# Patient Record
Sex: FEMALE | Race: ASIAN | NOT HISPANIC OR LATINO | URBAN - METROPOLITAN AREA
[De-identification: names, ages, dates, MRNs, and addresses within clinical notes are randomized per-mention and may not be internally consistent; named-entity substitution may affect disease eponyms.]

---

## 2019-11-17 ENCOUNTER — EMERGENCY (EMERGENCY)
Facility: HOSPITAL | Age: 29
LOS: 1 days | Discharge: HOME | End: 2019-11-17
Admitting: EMERGENCY MEDICINE
Payer: MEDICAID

## 2019-11-17 VITALS
TEMPERATURE: 99 F | OXYGEN SATURATION: 100 % | HEART RATE: 89 BPM | SYSTOLIC BLOOD PRESSURE: 156 MMHG | DIASTOLIC BLOOD PRESSURE: 99 MMHG | RESPIRATION RATE: 16 BRPM

## 2019-11-17 LAB
ALBUMIN SERPL ELPH-MCNC: 4.5 G/DL — SIGNIFICANT CHANGE UP (ref 3.3–5)
ALP SERPL-CCNC: 138 U/L — HIGH (ref 40–120)
ALT FLD-CCNC: 45 U/L — HIGH (ref 4–33)
ANION GAP SERPL CALC-SCNC: 12 MMO/L — SIGNIFICANT CHANGE UP (ref 7–14)
AST SERPL-CCNC: 31 U/L — SIGNIFICANT CHANGE UP (ref 4–32)
BASOPHILS # BLD AUTO: 0.03 K/UL — SIGNIFICANT CHANGE UP (ref 0–0.2)
BASOPHILS NFR BLD AUTO: 0.4 % — SIGNIFICANT CHANGE UP (ref 0–2)
BILIRUB SERPL-MCNC: 0.6 MG/DL — SIGNIFICANT CHANGE UP (ref 0.2–1.2)
BUN SERPL-MCNC: 8 MG/DL — SIGNIFICANT CHANGE UP (ref 7–23)
CALCIUM SERPL-MCNC: 9.5 MG/DL — SIGNIFICANT CHANGE UP (ref 8.4–10.5)
CHLORIDE SERPL-SCNC: 104 MMOL/L — SIGNIFICANT CHANGE UP (ref 98–107)
CO2 SERPL-SCNC: 22 MMOL/L — SIGNIFICANT CHANGE UP (ref 22–31)
CREAT SERPL-MCNC: 0.61 MG/DL — SIGNIFICANT CHANGE UP (ref 0.5–1.3)
EOSINOPHIL # BLD AUTO: 0.08 K/UL — SIGNIFICANT CHANGE UP (ref 0–0.5)
EOSINOPHIL NFR BLD AUTO: 1 % — SIGNIFICANT CHANGE UP (ref 0–6)
GLUCOSE SERPL-MCNC: 93 MG/DL — SIGNIFICANT CHANGE UP (ref 70–99)
HCT VFR BLD CALC: 40.2 % — SIGNIFICANT CHANGE UP (ref 34.5–45)
HGB BLD-MCNC: 13.4 G/DL — SIGNIFICANT CHANGE UP (ref 11.5–15.5)
IMM GRANULOCYTES NFR BLD AUTO: 0.4 % — SIGNIFICANT CHANGE UP (ref 0–1.5)
LYMPHOCYTES # BLD AUTO: 2.38 K/UL — SIGNIFICANT CHANGE UP (ref 1–3.3)
LYMPHOCYTES # BLD AUTO: 29.3 % — SIGNIFICANT CHANGE UP (ref 13–44)
MCHC RBC-ENTMCNC: 30.5 PG — SIGNIFICANT CHANGE UP (ref 27–34)
MCHC RBC-ENTMCNC: 33.3 % — SIGNIFICANT CHANGE UP (ref 32–36)
MCV RBC AUTO: 91.6 FL — SIGNIFICANT CHANGE UP (ref 80–100)
MONOCYTES # BLD AUTO: 0.75 K/UL — SIGNIFICANT CHANGE UP (ref 0–0.9)
MONOCYTES NFR BLD AUTO: 9.2 % — SIGNIFICANT CHANGE UP (ref 2–14)
NEUTROPHILS # BLD AUTO: 4.85 K/UL — SIGNIFICANT CHANGE UP (ref 1.8–7.4)
NEUTROPHILS NFR BLD AUTO: 59.7 % — SIGNIFICANT CHANGE UP (ref 43–77)
NRBC # FLD: 0 K/UL — SIGNIFICANT CHANGE UP (ref 0–0)
PLATELET # BLD AUTO: 242 K/UL — SIGNIFICANT CHANGE UP (ref 150–400)
PMV BLD: 9.3 FL — SIGNIFICANT CHANGE UP (ref 7–13)
POTASSIUM SERPL-MCNC: 4.3 MMOL/L — SIGNIFICANT CHANGE UP (ref 3.5–5.3)
POTASSIUM SERPL-SCNC: 4.3 MMOL/L — SIGNIFICANT CHANGE UP (ref 3.5–5.3)
PROT SERPL-MCNC: 8.7 G/DL — HIGH (ref 6–8.3)
RBC # BLD: 4.39 M/UL — SIGNIFICANT CHANGE UP (ref 3.8–5.2)
RBC # FLD: 12 % — SIGNIFICANT CHANGE UP (ref 10.3–14.5)
SODIUM SERPL-SCNC: 138 MMOL/L — SIGNIFICANT CHANGE UP (ref 135–145)
WBC # BLD: 8.12 K/UL — SIGNIFICANT CHANGE UP (ref 3.8–10.5)
WBC # FLD AUTO: 8.12 K/UL — SIGNIFICANT CHANGE UP (ref 3.8–10.5)

## 2019-11-17 PROCEDURE — 99284 EMERGENCY DEPT VISIT MOD MDM: CPT | Mod: 25

## 2019-11-17 PROCEDURE — 93010 ELECTROCARDIOGRAM REPORT: CPT

## 2019-11-17 RX ORDER — ACETAMINOPHEN 500 MG
650 TABLET ORAL ONCE
Refills: 0 | Status: COMPLETED | OUTPATIENT
Start: 2019-11-17 | End: 2019-11-17

## 2019-11-17 RX ORDER — METOCLOPRAMIDE HCL 10 MG
10 TABLET ORAL ONCE
Refills: 0 | Status: COMPLETED | OUTPATIENT
Start: 2019-11-17 | End: 2019-11-17

## 2019-11-17 RX ORDER — MECLIZINE HCL 12.5 MG
25 TABLET ORAL ONCE
Refills: 0 | Status: COMPLETED | OUTPATIENT
Start: 2019-11-17 | End: 2019-11-17

## 2019-11-17 RX ORDER — SODIUM CHLORIDE 9 MG/ML
1000 INJECTION INTRAMUSCULAR; INTRAVENOUS; SUBCUTANEOUS ONCE
Refills: 0 | Status: COMPLETED | OUTPATIENT
Start: 2019-11-17 | End: 2019-11-17

## 2019-11-17 RX ADMIN — SODIUM CHLORIDE 1000 MILLILITER(S): 9 INJECTION INTRAMUSCULAR; INTRAVENOUS; SUBCUTANEOUS at 22:24

## 2019-11-17 RX ADMIN — Medication 650 MILLIGRAM(S): at 22:24

## 2019-11-17 RX ADMIN — Medication 25 MILLIGRAM(S): at 22:24

## 2019-11-17 NOTE — ED ADULT NURSE NOTE - OBJECTIVE STATEMENT
Pt arrives for new onset dizziness starting last night associate with N/V, saying when she opened her eyes the room was spinning. She said this episode was preceded by a headache. She states she has been getting these headaches on and off for the last 6 months; she has not seen a neurologist yet. #20 PIV to [facility], labs sent, meds given, Will continue to monitor

## 2019-11-17 NOTE — ED ADULT TRIAGE NOTE - CHIEF COMPLAINT QUOTE
dizziness    states yesterday, right side of head started to hurt, then vomited approx 7-6 times... now feels dizzy jorge on standing.  feels like room is moving... vision is altered since last night... states still objects seem like they moving slightly.    states has had intermittent headache for months but has not sought medical attention before.  pt walked with steady gait. dizziness    states yesterday, right side of head started to hurt, then vomited approx 7-6 times... now feels dizzy jorge on standing.  feels like room is moving... vision is altered since last night... states still objects seem like they moving slightly.    states has had intermittent headache for months but has not sought medical attention before last week.  saw her pmd for regular blood work-- was told to f/u with a neurologist and started on nasal spray but feels worse.  pt reports is on prenatal vitamins-- attempting to have a baby.   pt walked with steady gait.

## 2019-11-17 NOTE — ED PROVIDER NOTE - NSFOLLOWUPINSTRUCTIONS_ED_ALL_ED_FT
Follow up with your PMD within 48-72 hours- show copies of your reports given to you.   Rest, increase your fluids.   Take Motrin 600 mg every 6-8 hours with food for pain.    Follow up with Neurology within the week - you can call: Find a Physician helpline ([phone]) for assistance    Worsening, continued or ANY new concerning symptoms return to the emergency department.    WHAT YOU NEED TO KNOW:    Headache pain may be mild or severe. Common causes include stress, medicines, and head injuries. Sleep problems, allergies, and hormone changes can also cause a headache. You may have frequent headaches that have no clear cause. Pain may start in another part of your body and move to your head. Headache pain can also move to other parts of your body. A headache can cause other symptoms, such as nausea and vomiting. A severe headache may be a sign of a stroke or other serious problem that needs immediate treatment.    DISCHARGE INSTRUCTIONS:    Call 911 for any of the following:     You have any of the following signs of a stroke:     Numbness or drooping on one side of your face   Weakness in an arm or leg  Confusion or difficulty speaking  Dizziness, a severe headache, or vision loss    Return to the emergency department if:     You have a headache with neck stiffness and a fever.  You have a constant headache and are vomiting.  You have severe pain that does not get better after you take pain medicin  You have a headache and the pain worsens when you look into light.  You have a headache and vision changes, such as blurred vision.  You have a headache and are forgetful or confused.    Contact your healthcare provider if:     You have a headache each day that does not get better, even after treatment.  You have changes in your headaches, or new symptoms that occur when you have a headache.  Others you live or work with also have headaches.  You have questions or concerns about your condition or care.    Medicines: You may need any of the following:     Medicines may be given to prevent or treat headache pain. Do not wait until the pain is severe to take your medicine. Ask your healthcare provider how to take the medicine safely.       NSAIDs, such as ibuprofen, help decrease swelling, pain, and fever. This medicine is available with or without a doctor's order. NSAIDs can cause stomach bleeding or kidney problems in certain people. If you take blood thinner medicine, always ask if NSAIDs are safe for you. Always read the medicine label and follow directions. Do not give these medicines to children under 6 months of age without direction from your child's healthcare provider.      Acetaminophen decreases pain and fever. It is available without a doctor's order. Ask how much to take and how often to take it. Follow directions. Read the labels of all other medicines you are using to see if they also contain acetaminophen, or ask your doctor or pharmacist. Acetaminophen can cause liver damage if not taken correctly. Do not use more than 4 grams (4,000 milligrams) total of acetaminophen in one day.       Antinausea medicine may be given to calm your stomach and help prevent vomiting.      Take your medicine as directed. Contact your healthcare provider if you think your medicine is not helping or if you have side effects. Tell him of her if you are allergic to any medicine. Keep a list of the medicines, vitamins, and herbs you take. Include the amounts, and when and why you take them. Bring the list or the pill bottles to follow-up visits. Carry your medicine list with you in case of an emergency.    Manage your symptoms:     Rest in a dark and quiet room. This may help decrease your pain.      Apply heat or ice as directed. Heat or ice may help decrease pain or muscle spasms. Apply heat or ice on the area for 20 minutes every 2 hours for as many days as directed. Your healthcare provider may recommend that you alternate heat and ice.      Relax your muscles to help relieve a headache. Lie down in a comfortable position and close your eyes. Relax your muscles slowly. Start at your toes and work your way up your body. A massage or warm bath may also help relax your muscles.    Keep a headache record: Record the dates and times that you get headaches, and what you were doing before the headache started. Also record what you ate and drank in the 24 hours before the headache started. This might help your healthcare provider find the cause of your headaches and make a treatment plan. The record can also help you avoid headache triggers or manage your symptoms.    Get enough sleep: You should get 8 to 10 hours of sleep each night. Create a sleep schedule. Go to bed and wake up at the same times each day. It may be helpful to do something relaxing before bed. Do not watch television right before bed.    Do not smoke: Nicotine and other chemicals in cigarettes and cigars can trigger a headache or make it worse. Ask your healthcare provider for information if you currently smoke and need help to quit. E-cigarettes or smokeless tobacco still contain nicotine. Talk to your healthcare provider before you use these products.     Drink liquids as directed: You may need to drink more liquid to prevent dehydration. Dehydration can cause a headache. Ask your healthcare provider how much liquid to drink each day and which liquids are best for you.     Limit caffeine and alcohol as directed: Your headaches may be triggered by caffeine or alcohol. You may also develop a headache if you drink caffeine regularly and suddenly stop.    Eat a variety of healthy foods: Do not skip meals. Too little food can trigger a headache. Include fruits, vegetables, whole-grain breads, low-fat dairy products, beans, lean meat, and fish. Do not have trigger foods, such as chocolate and red wine. Foods that contain gluten, nitrates, MSG, or artificial sweeteners may also trigger a headache.    Follow up with your healthcare provider as directed: Write down your questions so you remember to ask them during your visits.

## 2019-11-17 NOTE — ED PROVIDER NOTE - PROGRESS NOTE DETAILS
ARETHA Kearney- Labs stable. UCG (-). EKG NSR no acute findings. Patient dizziness resolved with Meclizine. HA improved with Tylenol but still present. Will give Reglan and reassess. Signed out to ARETHA Osborn.

## 2019-11-17 NOTE — ED PROVIDER NOTE - OBJECTIVE STATEMENT
30 y/o F states intermittent headaches x 5-6 months seen by PMD and referred to a Neurologist from MRI but has not gone yet. States acute onset right sided headache last night followed by room spinning dizziness and vomiting x 1 episode. States the headaches are controlled with Motrin but never had dizziness before. States mild nasal congestion. Denies fever, chills, chest pain, shortness of breath, weakness, numbness, tingling. Trying for pregnancy. LMP- 1 month ago.

## 2019-11-17 NOTE — ED ADULT NURSE NOTE - CHIEF COMPLAINT QUOTE
dizziness    states yesterday, right side of head started to hurt, then vomited approx 7-6 times... now feels dizzy jorge on standing.  feels like room is moving... vision is altered since last night... states still objects seem like they moving slightly.    states has had intermittent headache for months but has not sought medical attention before last week.  saw her pmd for regular blood work-- was told to f/u with a neurologist and started on nasal spray but feels worse.  pt reports is on prenatal vitamins-- attempting to have a baby.   pt walked with steady gait.

## 2019-11-17 NOTE — ED PROVIDER NOTE - CLINICAL SUMMARY MEDICAL DECISION MAKING FREE TEXT BOX
Headache followed by room spinning dizziness with vomiting x 1 with normal neurological exam- check UCG, basic labs, give fluids, Meclizine, Tylenol and reassess

## 2019-11-17 NOTE — ED PROVIDER NOTE - PATIENT PORTAL LINK FT
You can access the FollowMyHealth Patient Portal offered by [facility] by registering at the following website: [email or web address]. By joining [facility] FollowMyHealth portal, you will also be able to view your health information using other applications (apps) compatible with our system.

## 2019-11-17 NOTE — ED PROVIDER NOTE - CARE PLAN
Principal Discharge DX:	Vertigo Principal Discharge DX:	Vertigo  Secondary Diagnosis:	Nonintractable episodic headache, unspecified headache type

## 2019-11-17 NOTE — ED PROVIDER NOTE - CHIEF COMPLAINT
The patient is a 29y Female complaining of dizziness.
Size Of Lesion In Cm (Optional): 0
Detail Level: Detailed

## 2019-11-17 NOTE — ED PROVIDER NOTE - CHPI ED SYMPTOMS NEG
no blurred vision/no loss of consciousness/no weakness/no change in level of consciousness/no numbness

## 2019-11-18 RX ORDER — MECLIZINE HCL 12.5 MG
1 TABLET ORAL
Qty: 21 | Refills: 0
Start: 2019-11-18 | End: 2019-11-24

## 2019-11-18 RX ADMIN — SODIUM CHLORIDE 1000 MILLILITER(S): 9 INJECTION INTRAMUSCULAR; INTRAVENOUS; SUBCUTANEOUS at 00:06

## 2019-11-18 RX ADMIN — Medication 10 MILLIGRAM(S): at 00:01

## 2019-11-18 RX ADMIN — Medication 650 MILLIGRAM(S): at 00:01

## 2022-04-18 NOTE — ED PROVIDER NOTE - RELIEVING FACTORS
Please tell pt to call Dr. Villaseñor's office at [phone] to make a followup appointment in 1 week. Please remove dressing in 24 hours. Replace cotton ball and change it as often as it gets soiled (approximately 5-7 times each day). Leave the cotton ball out when pt needs to change it less than twice each day. Elevate the head of the bed with two pillows.
lying down

## 2023-10-06 NOTE — ED ADULT TRIAGE NOTE - PAIN RATING/NUMBER SCALE (0-10): ACTIVITY
Detail Level: Detailed Add 52 Modifier (Optional): no Consent: The patient's consent was obtained including but not limited to risks of crusting, scabbing, blistering, scarring, darker or lighter pigmentary change, recurrence, incomplete removal and infection. Aperture Size (Optional): C Number Of Freeze-Thaw Cycles: 3 freeze-thaw cycles Spray Paint Text: The liquid nitrogen was applied to the skin utilizing a spray paint frosting technique. Show Aperture Variable?: Yes Pared With?: 15 blade Post-Care Instructions: I reviewed with the patient in detail post-care instructions. Patient is to wear sunprotection, and avoid picking at any of the treated lesions. Pt may apply Vaseline to crusted or scabbing areas. Duration Of Freeze Thaw-Cycle (Seconds): 5-10 Medical Necessity Clause: This procedure was medically necessary because the lesions that were treated were: 8 Medical Necessity Information: It is in your best interest to select a reason for this procedure from the list below. All of these items fulfill various CMS LCD requirements except the new and changing color options. Application Tool (Optional): Liquid Nitrogen Sprayer